# Patient Record
Sex: FEMALE | Race: WHITE | NOT HISPANIC OR LATINO
[De-identification: names, ages, dates, MRNs, and addresses within clinical notes are randomized per-mention and may not be internally consistent; named-entity substitution may affect disease eponyms.]

---

## 2019-11-25 PROBLEM — Z00.00 ENCOUNTER FOR PREVENTIVE HEALTH EXAMINATION: Status: ACTIVE | Noted: 2019-11-25

## 2019-12-19 ENCOUNTER — APPOINTMENT (OUTPATIENT)
Dept: GYNECOLOGIC ONCOLOGY | Facility: CLINIC | Age: 34
End: 2019-12-19
Payer: COMMERCIAL

## 2019-12-19 ENCOUNTER — APPOINTMENT (OUTPATIENT)
Dept: PLASTIC SURGERY | Facility: CLINIC | Age: 34
End: 2019-12-19
Payer: COMMERCIAL

## 2019-12-19 ENCOUNTER — APPOINTMENT (OUTPATIENT)
Dept: SURGERY | Facility: CLINIC | Age: 34
End: 2019-12-19
Payer: COMMERCIAL

## 2019-12-19 VITALS
SYSTOLIC BLOOD PRESSURE: 120 MMHG | DIASTOLIC BLOOD PRESSURE: 80 MMHG | TEMPERATURE: 98.4 F | WEIGHT: 134 LBS | HEIGHT: 64 IN | HEART RATE: 81 BPM | BODY MASS INDEX: 22.88 KG/M2 | OXYGEN SATURATION: 98 %

## 2019-12-19 DIAGNOSIS — Z80.3 FAMILY HISTORY OF MALIGNANT NEOPLASM OF BREAST: ICD-10-CM

## 2019-12-19 PROCEDURE — 99203 OFFICE O/P NEW LOW 30 MIN: CPT

## 2019-12-19 PROCEDURE — 99205 OFFICE O/P NEW HI 60 MIN: CPT

## 2019-12-19 NOTE — HISTORY OF PRESENT ILLNESS
[FreeTextEntry1] : 33 y/o F with BRCA1 positive mutation is referred from Dr. Power to discuss her options for breast reconstruction. She is planning bilateral prophylactic mastectomy. Current bra size is A cup. She has travelled from [place]. History of 3 pregnancies via . Strong family history of breast cancer. She is most interested in autologous tissue reconstruction. Nonsmoker. \par \par exam: A cup, no nipple inversion, no masses, no scars\par abdomen: adequate pannus for autologous tissue transfer, may be smaller than current size\par \par Today we reviewed all options for breast reconstruction. Patient most interested in autologous reconstruction. Nature of the surgery, risks, benefits, alternatives, expected postoperative course, recovery and long term results were reviewed. Nature of microsurgery, and potential for partial or total flap loss were reviewed. Staged nature of surgery, with additional trips to OR for revisions and nipple reconstruction (if non nipple sparing mastectomy) was reviewed. All questions answered. Ms. PREM WALL is motivated to proceed with surgery.\par Will plan to obtain CT angiogram of abdomen and pelvis prior to surgery to help map vascular perforators. Will coordinate with Dr. Power.\par

## 2019-12-20 ENCOUNTER — APPOINTMENT (OUTPATIENT)
Dept: ULTRASOUND IMAGING | Facility: HOSPITAL | Age: 34
End: 2019-12-20

## 2019-12-20 ENCOUNTER — OUTPATIENT (OUTPATIENT)
Dept: OUTPATIENT SERVICES | Facility: HOSPITAL | Age: 34
LOS: 1 days | End: 2019-12-20
Payer: COMMERCIAL

## 2019-12-20 ENCOUNTER — RESULT REVIEW (OUTPATIENT)
Age: 34
End: 2019-12-20

## 2019-12-20 DIAGNOSIS — R22.32 LOCALIZED SWELLING, MASS AND LUMP, LEFT UPPER LIMB: ICD-10-CM

## 2019-12-20 DIAGNOSIS — N64.9 DISORDER OF BREAST, UNSPECIFIED: ICD-10-CM

## 2019-12-20 PROCEDURE — 88341 IMHCHEM/IMCYTCHM EA ADD ANTB: CPT | Mod: 26,59

## 2019-12-20 PROCEDURE — 88341 IMHCHEM/IMCYTCHM EA ADD ANTB: CPT

## 2019-12-20 PROCEDURE — 88360 TUMOR IMMUNOHISTOCHEM/MANUAL: CPT | Mod: 26

## 2019-12-20 PROCEDURE — 88305 TISSUE EXAM BY PATHOLOGIST: CPT

## 2019-12-20 PROCEDURE — 88305 TISSUE EXAM BY PATHOLOGIST: CPT | Mod: 26

## 2019-12-20 PROCEDURE — 19084 BX BREAST ADD LESION US IMAG: CPT

## 2019-12-20 PROCEDURE — 19083 BX BREAST 1ST LESION US IMAG: CPT

## 2019-12-20 PROCEDURE — 38505 NEEDLE BIOPSY LYMPH NODES: CPT

## 2019-12-20 PROCEDURE — 19083 BX BREAST 1ST LESION US IMAG: CPT | Mod: LT

## 2019-12-20 PROCEDURE — 76942 ECHO GUIDE FOR BIOPSY: CPT

## 2019-12-20 PROCEDURE — 19084 BX BREAST ADD LESION US IMAG: CPT | Mod: LT

## 2019-12-20 PROCEDURE — 88360 TUMOR IMMUNOHISTOCHEM/MANUAL: CPT

## 2019-12-20 PROCEDURE — 76641 ULTRASOUND BREAST COMPLETE: CPT

## 2019-12-20 PROCEDURE — 88342 IMHCHEM/IMCYTCHM 1ST ANTB: CPT | Mod: 26,59

## 2019-12-20 PROCEDURE — 76641 ULTRASOUND BREAST COMPLETE: CPT | Mod: 26,LT

## 2019-12-20 PROCEDURE — 77065 DX MAMMO INCL CAD UNI: CPT | Mod: 26,LT

## 2019-12-20 PROCEDURE — 77065 DX MAMMO INCL CAD UNI: CPT

## 2019-12-20 PROCEDURE — A4648: CPT

## 2019-12-22 NOTE — PAST MEDICAL HISTORY
[Menstruating] : The patient is menstruating [Menarche Age ____] : age at menarche was [unfilled] [Approximately ___] : the LMP was approximately [unfilled] [Total Preg ___] : G[unfilled] [Live Births ___] : P[unfilled]  [Age At Live Birth ___] : Age at live birth: [unfilled] [Living ___] : Living: [unfilled] [History of Hormone Replacement Treatment] : has no history of hormone replacement treatment [FreeTextEntry6] : None [FreeTextEntry7] : On Junel, been on for 4 years [FreeTextEntry8] : 1st child x12mo, 2nd child x 15mo , 3rd child x 15mo

## 2019-12-22 NOTE — HISTORY OF PRESENT ILLNESS
[FreeTextEntry1] : 34 year old pre-menopausal female, referred by a family friend, presents for consultation regarding her BRCA 1 positive status found on [facility] genetic testing completed on 4/29/2015. To note, patient has traveled from [place] for her consultation today. \par \par

## 2019-12-24 LAB — SURGICAL PATHOLOGY STUDY: SIGNIFICANT CHANGE UP

## 2019-12-31 NOTE — HISTORY OF PRESENT ILLNESS
[FreeTextEntry1] : Problem\par 1) BRCA1 mutation\par 2) No personal history of cancer, Maternal Breast Ca age 46, Maternal aunt breast Ca age 60, Maternal grandmother breast ca age 60\par \par 33 yo self referred for consultation regarding BRCA1 management at time of visit with Klaus Lang and Police.

## 2019-12-31 NOTE — DISCUSSION/SUMMARY
[FreeTextEntry1] : I had a long discussion with the patient with the aid of diagrams and reviewed the pertinent findings. In patients with known deleterious BRCA1 mutation there are several benefits to prophylactic BSO. One is the decreased risk of developing ovarian cancer, the one other benefit is a decrease in the risk of developing breast cancer. The lifetime risk of developing ovarian cancer in patients with deleterious BRCA1 mutation is 40%. The lifetime risk of developing breast cancer is approximately 80%. It is the recommendation of the SGO and ACOG that patients undergo RRSO at the age of 35 or when childbearing is complete. Current data suggest that the origin of most high-grade serous tumors associated with BRCA1 mutation arise from the fallopian. A study assessing the effect of risk reducing salpingectomy (without removal of the ovaries) is ongoing. Without these results we cannot know if RRS will be as effective as RRSO in decreasing risk of ovarian cancer. However, it can be considered as an option in younger women who do not yet want to enter surgical menopause. The average age of ovarian cancer in these patients is 51, however in general age of onset in mutation carriers is younger than in unaffected patients.\par The side effects of pre-menopausal removal of the ovaries were discussed in detail. Effectively removal of the ovaries places patients in “surgical menopause.” Common symptoms include hot flashes, decrease in cognition, skin changes, weight gain, depression, and bone loss. \par Screening modalities have been proven ineffective in detecting ovarian cancer at earlier stages. In the absence of better options pelvic exams, transvaginal ultrasounds and CA-125 every 6 months can be considered for patients who decline surgical intervention.\par \par

## 2019-12-31 NOTE — PAST MEDICAL HISTORY
[Menstruating] : The patient is menstruating [Menarche Age ____] : age at menarche was [unfilled] [Approximately ___] : the LMP was approximately [unfilled] [Total Preg ___] : G[unfilled] [Full Term ___] : Full Term: [unfilled] [FreeTextEntry5] : Vaginal delivery x3\par

## 2020-01-27 VITALS
TEMPERATURE: 98 F | WEIGHT: 136.47 LBS | OXYGEN SATURATION: 99 % | DIASTOLIC BLOOD PRESSURE: 84 MMHG | SYSTOLIC BLOOD PRESSURE: 130 MMHG | HEIGHT: 64 IN | RESPIRATION RATE: 16 BRPM | HEART RATE: 69 BPM

## 2020-01-28 ENCOUNTER — RESULT REVIEW (OUTPATIENT)
Age: 35
End: 2020-01-28

## 2020-01-28 ENCOUNTER — INPATIENT (INPATIENT)
Facility: HOSPITAL | Age: 35
LOS: 3 days | Discharge: HOME | DRG: 581 | End: 2020-02-01
Attending: PLASTIC SURGERY | Admitting: PLASTIC SURGERY
Payer: COMMERCIAL

## 2020-01-28 ENCOUNTER — APPOINTMENT (OUTPATIENT)
Dept: GYNECOLOGIC ONCOLOGY | Facility: HOSPITAL | Age: 35
End: 2020-01-28

## 2020-01-28 ENCOUNTER — APPOINTMENT (OUTPATIENT)
Dept: SURGERY | Facility: CLINIC | Age: 35
End: 2020-01-28
Payer: COMMERCIAL

## 2020-01-28 DIAGNOSIS — Z15.01 GENETIC SUSCEPTIBILITY TO MALIGNANT NEOPLASM OF BREAST: ICD-10-CM

## 2020-01-28 DIAGNOSIS — Z98.890 OTHER SPECIFIED POSTPROCEDURAL STATES: Chronic | ICD-10-CM

## 2020-01-28 PROBLEM — F32.9 MAJOR DEPRESSIVE DISORDER, SINGLE EPISODE, UNSPECIFIED: Chronic | Status: ACTIVE | Noted: 2020-01-27

## 2020-01-28 PROBLEM — M25.539 PAIN IN UNSPECIFIED WRIST: Chronic | Status: ACTIVE | Noted: 2020-01-27

## 2020-01-28 PROBLEM — M76.60 ACHILLES TENDINITIS, UNSPECIFIED LEG: Chronic | Status: ACTIVE | Noted: 2020-01-27

## 2020-01-28 LAB — GLUCOSE BLDC GLUCOMTR-MCNC: 91 MG/DL — SIGNIFICANT CHANGE UP (ref 70–99)

## 2020-01-28 PROCEDURE — 88305 TISSUE EXAM BY PATHOLOGIST: CPT | Mod: 26

## 2020-01-28 PROCEDURE — 58940 REMOVAL OF OVARY(S): CPT

## 2020-01-28 PROCEDURE — 88331 PATH CONSLTJ SURG 1 BLK 1SPC: CPT | Mod: 26

## 2020-01-28 PROCEDURE — 88307 TISSUE EXAM BY PATHOLOGIST: CPT | Mod: 26

## 2020-01-28 PROCEDURE — S2068: CPT | Mod: LT

## 2020-01-28 PROCEDURE — 19303 MAST SIMPLE COMPLETE: CPT | Mod: 80,50,GC

## 2020-01-28 PROCEDURE — 19303 MAST SIMPLE COMPLETE: CPT | Mod: 50

## 2020-01-28 PROCEDURE — 38530 BIOPSY/REMOVAL LYMPH NODES: CPT

## 2020-01-28 PROCEDURE — 35876 REMOVAL OF CLOT IN GRAFT: CPT

## 2020-01-28 RX ORDER — GABAPENTIN 400 MG/1
600 CAPSULE ORAL ONCE
Refills: 0 | Status: COMPLETED | OUTPATIENT
Start: 2020-01-28 | End: 2020-01-28

## 2020-01-28 RX ORDER — METOCLOPRAMIDE HCL 10 MG
10 TABLET ORAL EVERY 6 HOURS
Refills: 0 | Status: DISCONTINUED | OUTPATIENT
Start: 2020-01-28 | End: 2020-02-01

## 2020-01-28 RX ORDER — CEFAZOLIN SODIUM 1 G
2000 VIAL (EA) INJECTION EVERY 8 HOURS
Refills: 0 | Status: DISCONTINUED | OUTPATIENT
Start: 2020-01-28 | End: 2020-01-28

## 2020-01-28 RX ORDER — SODIUM CHLORIDE 9 MG/ML
1000 INJECTION, SOLUTION INTRAVENOUS
Refills: 0 | Status: DISCONTINUED | OUTPATIENT
Start: 2020-01-28 | End: 2020-01-29

## 2020-01-28 RX ORDER — APREPITANT 80 MG/1
40 CAPSULE ORAL ONCE
Refills: 0 | Status: COMPLETED | OUTPATIENT
Start: 2020-01-28 | End: 2020-01-28

## 2020-01-28 RX ORDER — ONDANSETRON 8 MG/1
4 TABLET, FILM COATED ORAL EVERY 6 HOURS
Refills: 0 | Status: DISCONTINUED | OUTPATIENT
Start: 2020-01-28 | End: 2020-02-01

## 2020-01-28 RX ORDER — ASPIRIN/CALCIUM CARB/MAGNESIUM 324 MG
325 TABLET ORAL DAILY
Refills: 0 | Status: DISCONTINUED | OUTPATIENT
Start: 2020-01-28 | End: 2020-02-01

## 2020-01-28 RX ORDER — OXYCODONE HYDROCHLORIDE 5 MG/1
10 TABLET ORAL EVERY 4 HOURS
Refills: 0 | Status: DISCONTINUED | OUTPATIENT
Start: 2020-01-28 | End: 2020-02-01

## 2020-01-28 RX ORDER — SCOPALAMINE 1 MG/3D
1 PATCH, EXTENDED RELEASE TRANSDERMAL ONCE
Refills: 0 | Status: COMPLETED | OUTPATIENT
Start: 2020-01-28 | End: 2020-01-28

## 2020-01-28 RX ORDER — ACETAMINOPHEN 500 MG
1000 TABLET ORAL ONCE
Refills: 0 | Status: COMPLETED | OUTPATIENT
Start: 2020-01-28 | End: 2020-01-28

## 2020-01-28 RX ORDER — CEFAZOLIN SODIUM 1 G
2000 VIAL (EA) INJECTION EVERY 8 HOURS
Refills: 0 | Status: COMPLETED | OUTPATIENT
Start: 2020-01-28 | End: 2020-01-29

## 2020-01-28 RX ORDER — ACETAMINOPHEN 500 MG
975 TABLET ORAL EVERY 8 HOURS
Refills: 0 | Status: DISCONTINUED | OUTPATIENT
Start: 2020-01-28 | End: 2020-02-01

## 2020-01-28 RX ORDER — OXYCODONE HYDROCHLORIDE 5 MG/1
5 TABLET ORAL EVERY 4 HOURS
Refills: 0 | Status: DISCONTINUED | OUTPATIENT
Start: 2020-01-28 | End: 2020-02-01

## 2020-01-28 RX ORDER — HYDROMORPHONE HYDROCHLORIDE 2 MG/ML
0.5 INJECTION INTRAMUSCULAR; INTRAVENOUS; SUBCUTANEOUS
Refills: 0 | Status: DISCONTINUED | OUTPATIENT
Start: 2020-01-28 | End: 2020-02-01

## 2020-01-28 RX ORDER — DIAZEPAM 5 MG
5 TABLET ORAL EVERY 8 HOURS
Refills: 0 | Status: DISCONTINUED | OUTPATIENT
Start: 2020-01-28 | End: 2020-02-01

## 2020-01-28 RX ORDER — BUPIVACAINE 13.3 MG/ML
20 INJECTION, SUSPENSION, LIPOSOMAL INFILTRATION ONCE
Refills: 0 | Status: DISCONTINUED | OUTPATIENT
Start: 2020-01-28 | End: 2020-02-01

## 2020-01-28 RX ORDER — KETOROLAC TROMETHAMINE 30 MG/ML
30 SYRINGE (ML) INJECTION EVERY 6 HOURS
Refills: 0 | Status: DISCONTINUED | OUTPATIENT
Start: 2020-01-28 | End: 2020-01-31

## 2020-01-28 RX ORDER — SENNA PLUS 8.6 MG/1
2 TABLET ORAL AT BEDTIME
Refills: 0 | Status: DISCONTINUED | OUTPATIENT
Start: 2020-01-28 | End: 2020-02-01

## 2020-01-28 RX ADMIN — SCOPALAMINE 1 PATCH: 1 PATCH, EXTENDED RELEASE TRANSDERMAL at 07:40

## 2020-01-28 RX ADMIN — Medication 975 MILLIGRAM(S): at 22:22

## 2020-01-28 RX ADMIN — Medication 975 MILLIGRAM(S): at 23:21

## 2020-01-28 RX ADMIN — Medication 1000 MILLIGRAM(S): at 07:39

## 2020-01-28 RX ADMIN — APREPITANT 40 MILLIGRAM(S): 80 CAPSULE ORAL at 07:39

## 2020-01-28 RX ADMIN — SCOPALAMINE 1 PATCH: 1 PATCH, EXTENDED RELEASE TRANSDERMAL at 20:36

## 2020-01-28 RX ADMIN — Medication 30 MILLIGRAM(S): at 23:39

## 2020-01-28 RX ADMIN — GABAPENTIN 600 MILLIGRAM(S): 400 CAPSULE ORAL at 07:39

## 2020-01-28 RX ADMIN — Medication 2000 MILLIGRAM(S): at 23:25

## 2020-01-28 NOTE — BRIEF OPERATIVE NOTE - NSICDXBRIEFPREOP_GEN_ALL_CORE_FT
PRE-OP DIAGNOSIS:  BRCA1-associated protein-1 tumor predisposition syndrome 28-Jan-2020 11:00:35  Onelia Lambert

## 2020-01-28 NOTE — BRIEF OPERATIVE NOTE - NSICDXBRIEFPROCEDURE_GEN_ALL_CORE_FT
PROCEDURES:  Open bilateral salpingo-oophorectomy (BSO) 28-Jan-2020 20:39:55  Beverly Gutierrez
PROCEDURES:  Bilateral simple mastectomy 28-Jan-2020 11:00:24  Onelia Lambert

## 2020-01-28 NOTE — PROGRESS NOTE ADULT - SUBJECTIVE AND OBJECTIVE BOX
POST-OPERATIVE NOTE    Procedure: bilateral nipple sparing mastectomy with MELVIN reconstruction and bilateral salpingo-oophorectomy    Diagnosis/Indication: BRCA1 gene mutation positive    Surgeon: Dr. Power    S: Pt has no complaints. She states her pain is controlled with the medication.  Denies CP, SOB, HAMILTON, calf tenderness. Denies nausea, vomiting.    O:  T(C): 36.7 (01-28-20 @ 19:20), Max: 36.7 (01-28-20 @ 19:18)  T(F): 98 (01-28-20 @ 19:20), Max: 98 (01-28-20 @ 19:18)  HR: 73 (01-28-20 @ 21:00) (73 - 100)  BP: 108/68 (01-28-20 @ 21:00) (105/60 - 113/66)  RR: 12 (01-28-20 @ 21:00) (11 - 20)  SpO2: 100% (01-28-20 @ 21:00) (100% - 100%)  Wt(kg): --    Gen: NAD, resting comfortably in bed  Breast: incision areas are clean, dry and intact with good coloring and strong doppler sounds bilaterally, 2 JPs to suction with serosang output  C/V: NSR  Pulm: Nonlabored breathing, no respiratory distress  Abd: soft, NT/ND, incision area is C/D/I with 2 JPs to suction with serosang output  Extrem: WWP, no calf edema or tenderness, SCDs in place    A/P: 34y Female s/p above procedure  Diet: NPO sips/chips  IVF: LR @125cc/hr  Pain/nausea control  Lovenox/SCDs/OOBA/IS  Dispo pending pain control, PO tolerance, clinical improvement

## 2020-01-28 NOTE — PACU DISCHARGE NOTE - COMMENTS
Patient discharged from PACU after meeting criteria. Report given to 5W RN Arlene. VSS. RRWNL on room air. Cardiac monitor showing SR. Left radial padilla removed as per protocol. 2x2 dsg placed. Left and right 18g HL with LR at 125cc/h. B/L breast warm to touch, no discoloration good capillary refill and good doppler signals. Lower abd incision with derma bond no drainage noted. JPx 4 L&R breast, L&R abd sutured shea. Drainage amount entered on flow sheet. Patient t/f on bed unmonitored with private duty RN and  in situ. One bag of belongings sent with patient. Transported by transport team.

## 2020-01-28 NOTE — BRIEF OPERATIVE NOTE - OPERATION/FINDINGS
Patient prepped and draped in usual sterile fasion. Periareolar skin incision was made with inferior vertical extension. Flaps were raised in the plane between subcutaneous tissue and breast tissue from the clavicle superiorly, the sternum medially, and the anterior rectus sheath inferiorly, and past the lateral border of the pectoralis major muscle laterally. Hemostasis was achieved. Retroareolar tissue was sent to pathology for frozen section, which showed no malignant cells. At this point, the plastic surgery team continued with reconstruction. A separate operative note will be dictated for that portion of the surgery, please see Dr. Lang's note.
Normal appearing uterus, bilateral fallopian tubes, and bilateral ovaries. Bilateral ureters identified.

## 2020-01-29 LAB
ANION GAP SERPL CALC-SCNC: 10 MMOL/L — SIGNIFICANT CHANGE UP (ref 5–17)
BLD GP AB SCN SERPL QL: NEGATIVE — SIGNIFICANT CHANGE UP
BUN SERPL-MCNC: 10 MG/DL — SIGNIFICANT CHANGE UP (ref 7–23)
CALCIUM SERPL-MCNC: 7.4 MG/DL — LOW (ref 8.4–10.5)
CHLORIDE SERPL-SCNC: 108 MMOL/L — SIGNIFICANT CHANGE UP (ref 96–108)
CO2 SERPL-SCNC: 23 MMOL/L — SIGNIFICANT CHANGE UP (ref 22–31)
CREAT SERPL-MCNC: 0.79 MG/DL — SIGNIFICANT CHANGE UP (ref 0.5–1.3)
GLUCOSE SERPL-MCNC: 120 MG/DL — HIGH (ref 70–99)
HCT VFR BLD CALC: 19.6 % — CRITICAL LOW (ref 34.5–45)
HCT VFR BLD CALC: 21.2 % — LOW (ref 34.5–45)
HGB BLD-MCNC: 6.2 G/DL — CRITICAL LOW (ref 11.5–15.5)
HGB BLD-MCNC: 6.8 G/DL — CRITICAL LOW (ref 11.5–15.5)
MCHC RBC-ENTMCNC: 29.5 PG — SIGNIFICANT CHANGE UP (ref 27–34)
MCHC RBC-ENTMCNC: 29.8 PG — SIGNIFICANT CHANGE UP (ref 27–34)
MCHC RBC-ENTMCNC: 31.6 GM/DL — LOW (ref 32–36)
MCHC RBC-ENTMCNC: 32.1 GM/DL — SIGNIFICANT CHANGE UP (ref 32–36)
MCV RBC AUTO: 93 FL — SIGNIFICANT CHANGE UP (ref 80–100)
MCV RBC AUTO: 93.3 FL — SIGNIFICANT CHANGE UP (ref 80–100)
NRBC # BLD: 0 /100 WBCS — SIGNIFICANT CHANGE UP (ref 0–0)
NRBC # BLD: 0 /100 WBCS — SIGNIFICANT CHANGE UP (ref 0–0)
PLATELET # BLD AUTO: 109 K/UL — LOW (ref 150–400)
PLATELET # BLD AUTO: 121 K/UL — LOW (ref 150–400)
POTASSIUM SERPL-MCNC: 3.9 MMOL/L — SIGNIFICANT CHANGE UP (ref 3.5–5.3)
POTASSIUM SERPL-SCNC: 3.9 MMOL/L — SIGNIFICANT CHANGE UP (ref 3.5–5.3)
RBC # BLD: 2.1 M/UL — LOW (ref 3.8–5.2)
RBC # BLD: 2.28 M/UL — LOW (ref 3.8–5.2)
RBC # FLD: 12.7 % — SIGNIFICANT CHANGE UP (ref 10.3–14.5)
RBC # FLD: 12.7 % — SIGNIFICANT CHANGE UP (ref 10.3–14.5)
RH IG SCN BLD-IMP: POSITIVE — SIGNIFICANT CHANGE UP
SODIUM SERPL-SCNC: 141 MMOL/L — SIGNIFICANT CHANGE UP (ref 135–145)
WBC # BLD: 5.89 K/UL — SIGNIFICANT CHANGE UP (ref 3.8–10.5)
WBC # BLD: 6.83 K/UL — SIGNIFICANT CHANGE UP (ref 3.8–10.5)
WBC # FLD AUTO: 5.89 K/UL — SIGNIFICANT CHANGE UP (ref 3.8–10.5)
WBC # FLD AUTO: 6.83 K/UL — SIGNIFICANT CHANGE UP (ref 3.8–10.5)

## 2020-01-29 RX ORDER — HYDROMORPHONE HYDROCHLORIDE 2 MG/ML
0.5 INJECTION INTRAMUSCULAR; INTRAVENOUS; SUBCUTANEOUS ONCE
Refills: 0 | Status: DISCONTINUED | OUTPATIENT
Start: 2020-01-29 | End: 2020-01-29

## 2020-01-29 RX ORDER — SODIUM CHLORIDE 9 MG/ML
1000 INJECTION, SOLUTION INTRAVENOUS
Refills: 0 | Status: DISCONTINUED | OUTPATIENT
Start: 2020-01-29 | End: 2020-01-30

## 2020-01-29 RX ORDER — ENOXAPARIN SODIUM 100 MG/ML
40 INJECTION SUBCUTANEOUS EVERY 24 HOURS
Refills: 0 | Status: DISCONTINUED | OUTPATIENT
Start: 2020-01-29 | End: 2020-02-01

## 2020-01-29 RX ADMIN — SODIUM CHLORIDE 100 MILLILITER(S): 9 INJECTION, SOLUTION INTRAVENOUS at 09:58

## 2020-01-29 RX ADMIN — ENOXAPARIN SODIUM 40 MILLIGRAM(S): 100 INJECTION SUBCUTANEOUS at 14:01

## 2020-01-29 RX ADMIN — Medication 5 MILLIGRAM(S): at 22:13

## 2020-01-29 RX ADMIN — Medication 975 MILLIGRAM(S): at 15:02

## 2020-01-29 RX ADMIN — Medication 30 MILLIGRAM(S): at 05:51

## 2020-01-29 RX ADMIN — HYDROMORPHONE HYDROCHLORIDE 0.5 MILLIGRAM(S): 2 INJECTION INTRAMUSCULAR; INTRAVENOUS; SUBCUTANEOUS at 14:40

## 2020-01-29 RX ADMIN — Medication 30 MILLIGRAM(S): at 18:35

## 2020-01-29 RX ADMIN — OXYCODONE HYDROCHLORIDE 5 MILLIGRAM(S): 5 TABLET ORAL at 11:02

## 2020-01-29 RX ADMIN — OXYCODONE HYDROCHLORIDE 5 MILLIGRAM(S): 5 TABLET ORAL at 12:02

## 2020-01-29 RX ADMIN — Medication 975 MILLIGRAM(S): at 23:14

## 2020-01-29 RX ADMIN — SENNA PLUS 2 TABLET(S): 8.6 TABLET ORAL at 22:19

## 2020-01-29 RX ADMIN — Medication 5 MILLIGRAM(S): at 04:00

## 2020-01-29 RX ADMIN — Medication 2000 MILLIGRAM(S): at 14:01

## 2020-01-29 RX ADMIN — Medication 325 MILLIGRAM(S): at 12:16

## 2020-01-29 RX ADMIN — OXYCODONE HYDROCHLORIDE 10 MILLIGRAM(S): 5 TABLET ORAL at 18:46

## 2020-01-29 RX ADMIN — Medication 5 MILLIGRAM(S): at 12:38

## 2020-01-29 RX ADMIN — Medication 975 MILLIGRAM(S): at 05:51

## 2020-01-29 RX ADMIN — Medication 975 MILLIGRAM(S): at 22:14

## 2020-01-29 RX ADMIN — SCOPALAMINE 1 PATCH: 1 PATCH, EXTENDED RELEASE TRANSDERMAL at 18:12

## 2020-01-29 RX ADMIN — OXYCODONE HYDROCHLORIDE 10 MILLIGRAM(S): 5 TABLET ORAL at 19:35

## 2020-01-29 RX ADMIN — Medication 975 MILLIGRAM(S): at 14:01

## 2020-01-29 RX ADMIN — Medication 30 MILLIGRAM(S): at 12:40

## 2020-01-29 RX ADMIN — Medication 30 MILLIGRAM(S): at 18:11

## 2020-01-29 RX ADMIN — HYDROMORPHONE HYDROCHLORIDE 0.5 MILLIGRAM(S): 2 INJECTION INTRAMUSCULAR; INTRAVENOUS; SUBCUTANEOUS at 15:00

## 2020-01-29 RX ADMIN — Medication 30 MILLIGRAM(S): at 12:16

## 2020-01-29 RX ADMIN — Medication 2000 MILLIGRAM(S): at 05:51

## 2020-01-29 RX ADMIN — SCOPALAMINE 1 PATCH: 1 PATCH, EXTENDED RELEASE TRANSDERMAL at 06:03

## 2020-01-29 NOTE — PROVIDER CONTACT NOTE (CRITICAL VALUE NOTIFICATION) - ACTION/TREATMENT ORDERED:
ASHLEY Rivas from Team 1 made aware, action from team pending.
Plastics team will be rounding on pt shortly.

## 2020-01-29 NOTE — PROGRESS NOTE ADULT - SUBJECTIVE AND OBJECTIVE BOX
Patient seen at bedside. Pain is well controlled. Patient has a borges and SCDs. Has not yet passed flatus or ambulated. Tolerating clears. Denies headache, dizziness, nausea/vomiting, shortness of breath, palpitations, heavy bleeding.     T(C): 37.3 (01-29-20 @ 08:46), Max: 37.9 (01-28-20 @ 23:00)  HR: 86 (01-29-20 @ 08:46) (73 - 100)  BP: 84/57 (01-29-20 @ 08:46) (84/57 - 113/66)  RR: 17 (01-29-20 @ 08:46) (11 - 20)  SpO2: 97% (01-29-20 @ 08:46) (97% - 100%)    Gen: NAD, AO x3  Chest: normal work of breathing  Abdomen: soft, nontender, nondistended, no rebound or guarding, +bowel sounds  Incision: well approximated, no erythema or drainage  Extremities: no calf tenderness or erythema      01-28-20 @ 07:01  -  01-29-20 @ 07:00  --------------------------------------------------------  IN: 1125 mL / OUT: 1390 mL / NET: -265 mL    01-29-20 @ 07:01  -  01-29-20 @ 10:01  --------------------------------------------------------  IN: 350 mL / OUT: 0 mL / NET: 350 mL                              6.8    6.83  )-----------( 121      ( 29 Jan 2020 06:10 )             21.2     MEDICATIONS  (STANDING):  acetaminophen   Tablet .. 975 milliGRAM(s) Oral every 8 hours  aspirin 325 milliGRAM(s) Oral daily  BUpivacaine liposome 1.3% Injectable (no eMAR) 20 milliLiter(s) Local Injection once  ceFAZolin  Injectable. 2000 milliGRAM(s) IV Push every 8 hours  dextrose 5% + sodium chloride 0.45%. 1000 milliLiter(s) (100 mL/Hr) IV Continuous <Continuous>  enoxaparin Injectable 40 milliGRAM(s) SubCutaneous daily  ketorolac   Injectable 30 milliGRAM(s) IV Push every 6 hours    MEDICATIONS  (PRN):  diazepam    Tablet 5 milliGRAM(s) Oral every 8 hours PRN muscle spasms  HYDROmorphone  Injectable 0.5 milliGRAM(s) IV Push every 15 minutes PRN Severe Pain (7 - 10)  metoclopramide Injectable 10 milliGRAM(s) IV Push every 6 hours PRN Nausea and/or Vomiting  ondansetron Injectable 4 milliGRAM(s) IV Push every 6 hours PRN Nausea  oxyCODONE    IR 5 milliGRAM(s) Oral every 4 hours PRN Moderate Pain (4 - 6)  oxyCODONE    IR 10 milliGRAM(s) Oral every 4 hours PRN Severe Pain (7 - 10)  senna 2 Tablet(s) Oral at bedtime PRN Constipation

## 2020-01-29 NOTE — PROGRESS NOTE ADULT - ASSESSMENT
A/P 34F s/p b/l simple NSM, b/l BSO, breast reconstruction with bilateral MELVIN flaps POD1  - Continue q1 hour flap check  - D/C borges  - IV tylenol/toradol  - Continue ASA  - Lovenox  - Ambulate with assistance  - CLD  - Change IVF to maintenance  - PT eval  - Will monitor Hgb and may transfuse if patient is symptomatic; currently she is not

## 2020-01-29 NOTE — PROGRESS NOTE ADULT - ASSESSMENT
33 yo female with PMH BRCA 1 pathologic mutation with multiple family members with breast cancer presents for prophylactic bilateral nipple sparing mastectomy with MELVIN reconstruction and bilateral salpingo-oophorectomy     Pain/Nausea control   NPO except meds/IVF   Aspirin   Repeat CBC @ 12 pm 1/29  4 DEVEN drains

## 2020-01-29 NOTE — PROGRESS NOTE ADULT - SUBJECTIVE AND OBJECTIVE BOX
Plastic Surgery    SUBJECTIVE:   Patient did well overnight, pain controlled with tylenol, toradol, valium      VITALS  T(C): 36.9 (01-29-20 @ 05:41), Max: 37.9 (01-28-20 @ 23:00)  HR: 90 (01-29-20 @ 05:41) (73 - 100)  BP: 97/59 (01-29-20 @ 05:41) (97/59 - 113/66)  RR: 16 (01-29-20 @ 05:41) (11 - 20)  SpO2: 97% (01-29-20 @ 05:41) (97% - 100%)  CAPILLARY BLOOD GLUCOSE      POCT Blood Glucose.: 91 mg/dL (28 Jan 2020 07:46)      Is/Os    01-28 @ 07:01  -  01-29 @ 07:00  --------------------------------------------------------  IN:    lactated ringers.: 1125 mL  Total IN: 1125 mL    OUT:    Drain: 75 mL    Drain: 25 mL    Drain: 50 mL    Drain: 55 mL    Indwelling Catheter - Urethral: 1185 mL  Total OUT: 1390 mL    Total NET: -265 mL          PHYSICAL EXAM:   General: NAD, Lying in bed comfortably  Chest: Bilateral breast incisions intact, appropriately TTP in axillae. Stable amy ecchymoses over bilateral upper poles.  NAC appear viable and well positioned bilaterally.  MELVIN flaps appropriate color, temperature, turgor, cap refill, and will good arterial doppler signals  Abd: Dressing over Umbo CDI, abdominal incision intact with minimal bruising.  All drains SS    MEDICATIONS (STANDING): acetaminophen   Tablet .. 975 milliGRAM(s) Oral every 8 hours  aspirin 325 milliGRAM(s) Oral daily  BUpivacaine liposome 1.3% Injectable (no eMAR) 20 milliLiter(s) Local Injection once  ceFAZolin  Injectable. 2000 milliGRAM(s) IV Push every 8 hours  ketorolac   Injectable 30 milliGRAM(s) IV Push every 6 hours  lactated ringers. 1000 milliLiter(s) IV Continuous <Continuous>    MEDICATIONS (PRN):diazepam    Tablet 5 milliGRAM(s) Oral every 8 hours PRN muscle spasms  HYDROmorphone  Injectable 0.5 milliGRAM(s) IV Push every 15 minutes PRN Severe Pain (7 - 10)  metoclopramide Injectable 10 milliGRAM(s) IV Push every 6 hours PRN Nausea and/or Vomiting  ondansetron Injectable 4 milliGRAM(s) IV Push every 6 hours PRN Nausea  oxyCODONE    IR 5 milliGRAM(s) Oral every 4 hours PRN Moderate Pain (4 - 6)  oxyCODONE    IR 10 milliGRAM(s) Oral every 4 hours PRN Severe Pain (7 - 10)  senna 2 Tablet(s) Oral at bedtime PRN Constipation      LABS  CBC (01-29 @ 06:10)                              6.8<LL>                         6.83    )----------------(  121<L>     --    % Neutrophils, --    % Lymphocytes, ANC: --                                  21.2<L>    BMP (01-29 @ 06:10)             141     |  108     |  10    		Ca++ --      Ca 7.4<L>             ---------------------------------( 120<H>		Mg --                 3.9     |  23      |  0.79  			Ph --                    IMAGING STUDIES

## 2020-01-29 NOTE — PROGRESS NOTE ADULT - SUBJECTIVE AND OBJECTIVE BOX
SUBJECTIVE: Patient seen and examined at bedside with chief. Pain controlled with medication, has not ambulated out of bed.   Patient denies chest pain, shortness of breathe, nausea or emesis.       MEDICATIONS  (STANDING):  acetaminophen   Tablet .. 975 milliGRAM(s) Oral every 8 hours  aspirin 325 milliGRAM(s) Oral daily  BUpivacaine liposome 1.3% Injectable (no eMAR) 20 milliLiter(s) Local Injection once  ceFAZolin  Injectable. 2000 milliGRAM(s) IV Push every 8 hours  ketorolac   Injectable 30 milliGRAM(s) IV Push every 6 hours  lactated ringers. 1000 milliLiter(s) (125 mL/Hr) IV Continuous <Continuous>    MEDICATIONS  (PRN):  diazepam    Tablet 5 milliGRAM(s) Oral every 8 hours PRN muscle spasms  HYDROmorphone  Injectable 0.5 milliGRAM(s) IV Push every 15 minutes PRN Severe Pain (7 - 10)  metoclopramide Injectable 10 milliGRAM(s) IV Push every 6 hours PRN Nausea and/or Vomiting  ondansetron Injectable 4 milliGRAM(s) IV Push every 6 hours PRN Nausea  oxyCODONE    IR 5 milliGRAM(s) Oral every 4 hours PRN Moderate Pain (4 - 6)  oxyCODONE    IR 10 milliGRAM(s) Oral every 4 hours PRN Severe Pain (7 - 10)  senna 2 Tablet(s) Oral at bedtime PRN Constipation      Vital Signs Last 24 Hrs  T(C): 36.9 (29 Jan 2020 05:41), Max: 37.9 (28 Jan 2020 23:00)  T(F): 98.5 (29 Jan 2020 05:41), Max: 100.2 (28 Jan 2020 23:00)  HR: 90 (29 Jan 2020 05:41) (73 - 100)  BP: 97/59 (29 Jan 2020 05:41) (97/59 - 113/66)  BP(mean): 80 (29 Jan 2020 00:30) (75 - 87)  RR: 16 (29 Jan 2020 05:41) (11 - 20)  SpO2: 97% (29 Jan 2020 05:41) (97% - 100%)    Physical Exam:  GENERAL: NAD, Resting comfortably in bed  RESP: Nonlabored breathing, No respiratory distress  Breast: 4 DEVEN drains, with SS output, incisions are clean dry and intact, no bruising noted around incisions sites or around breasts   CARD: Normal rate, Normal peripheral perfusion  GI: Soft, NT, ND, No guarding, No rebound tenderness  EXTREM: WWP, No edema, SCDs in place    I&O's Summary    28 Jan 2020 07:01  -  29 Jan 2020 07:00  --------------------------------------------------------  IN: 1125 mL / OUT: 1390 mL / NET: -265 mL        LABS:                        6.8    6.83  )-----------( 121      ( 29 Jan 2020 06:10 )             21.2     01-29    141  |  108  |  10  ----------------------------<  120<H>  3.9   |  23  |  0.79    Ca    7.4<L>      29 Jan 2020 06:10          CAPILLARY BLOOD GLUCOSE      POCT Blood Glucose.: 91 mg/dL (28 Jan 2020 07:46)

## 2020-01-29 NOTE — PROGRESS NOTE ADULT - ASSESSMENT
34y Female BRCA 1 pathologic mutation with multiple family members with breast cancer POD# 1   s/p  presents for prophylactic bilateral nipple sparing mastectomy with MELVIN reconstruction and risk reducing bilateral salpingo-oophorectomy, doing well. Primary management by surgery team, GYN to sign off at this time. Patient has follow up appointment scheduled with Dr. Giron on February 6th at 12:15 PM. Please call GYN with any questions or concerns.

## 2020-01-30 ENCOUNTER — TRANSCRIPTION ENCOUNTER (OUTPATIENT)
Age: 35
End: 2020-01-30

## 2020-01-30 RX ORDER — OXYCODONE HYDROCHLORIDE 5 MG/1
1 TABLET ORAL
Qty: 30 | Refills: 0
Start: 2020-01-30 | End: 2020-02-05

## 2020-01-30 RX ORDER — DIAZEPAM 5 MG
1 TABLET ORAL
Qty: 21 | Refills: 0
Start: 2020-01-30 | End: 2020-02-05

## 2020-01-30 RX ORDER — SODIUM CHLORIDE 9 MG/ML
1000 INJECTION, SOLUTION INTRAVENOUS
Refills: 0 | Status: DISCONTINUED | OUTPATIENT
Start: 2020-01-30 | End: 2020-01-30

## 2020-01-30 RX ORDER — ASPIRIN/CALCIUM CARB/MAGNESIUM 324 MG
1 TABLET ORAL
Qty: 0 | Refills: 0 | DISCHARGE
Start: 2020-01-30

## 2020-01-30 RX ADMIN — ENOXAPARIN SODIUM 40 MILLIGRAM(S): 100 INJECTION SUBCUTANEOUS at 12:31

## 2020-01-30 RX ADMIN — Medication 975 MILLIGRAM(S): at 06:22

## 2020-01-30 RX ADMIN — Medication 975 MILLIGRAM(S): at 21:56

## 2020-01-30 RX ADMIN — Medication 5 MILLIGRAM(S): at 23:07

## 2020-01-30 RX ADMIN — OXYCODONE HYDROCHLORIDE 10 MILLIGRAM(S): 5 TABLET ORAL at 19:45

## 2020-01-30 RX ADMIN — Medication 30 MILLIGRAM(S): at 12:30

## 2020-01-30 RX ADMIN — Medication 975 MILLIGRAM(S): at 07:22

## 2020-01-30 RX ADMIN — Medication 30 MILLIGRAM(S): at 00:30

## 2020-01-30 RX ADMIN — Medication 30 MILLIGRAM(S): at 23:31

## 2020-01-30 RX ADMIN — Medication 325 MILLIGRAM(S): at 12:30

## 2020-01-30 RX ADMIN — Medication 30 MILLIGRAM(S): at 06:37

## 2020-01-30 RX ADMIN — SCOPALAMINE 1 PATCH: 1 PATCH, EXTENDED RELEASE TRANSDERMAL at 18:03

## 2020-01-30 RX ADMIN — OXYCODONE HYDROCHLORIDE 10 MILLIGRAM(S): 5 TABLET ORAL at 04:15

## 2020-01-30 RX ADMIN — Medication 30 MILLIGRAM(S): at 18:00

## 2020-01-30 RX ADMIN — SCOPALAMINE 1 PATCH: 1 PATCH, EXTENDED RELEASE TRANSDERMAL at 06:31

## 2020-01-30 RX ADMIN — OXYCODONE HYDROCHLORIDE 10 MILLIGRAM(S): 5 TABLET ORAL at 18:59

## 2020-01-30 RX ADMIN — Medication 30 MILLIGRAM(S): at 23:06

## 2020-01-30 RX ADMIN — OXYCODONE HYDROCHLORIDE 10 MILLIGRAM(S): 5 TABLET ORAL at 03:15

## 2020-01-30 RX ADMIN — Medication 30 MILLIGRAM(S): at 12:50

## 2020-01-30 RX ADMIN — Medication 30 MILLIGRAM(S): at 06:22

## 2020-01-30 RX ADMIN — OXYCODONE HYDROCHLORIDE 10 MILLIGRAM(S): 5 TABLET ORAL at 10:56

## 2020-01-30 RX ADMIN — OXYCODONE HYDROCHLORIDE 10 MILLIGRAM(S): 5 TABLET ORAL at 11:40

## 2020-01-30 RX ADMIN — SENNA PLUS 2 TABLET(S): 8.6 TABLET ORAL at 21:58

## 2020-01-30 RX ADMIN — Medication 30 MILLIGRAM(S): at 00:45

## 2020-01-30 RX ADMIN — Medication 975 MILLIGRAM(S): at 13:51

## 2020-01-30 RX ADMIN — Medication 975 MILLIGRAM(S): at 22:56

## 2020-01-30 RX ADMIN — Medication 5 MILLIGRAM(S): at 06:22

## 2020-01-30 RX ADMIN — Medication 975 MILLIGRAM(S): at 14:40

## 2020-01-30 NOTE — PROGRESS NOTE ADULT - ASSESSMENT
33 yo female with PMH BRCA 1 pathologic mutation with multiple family members with breast cancer presents for prophylactic bilateral nipple sparing mastectomy with MELVIN reconstruction and bilateral salpingo-oophorectomy. Pulse checks appropriate, doing well.    Hgb stable x2 at 6.8 to 6.2 yesterday, patient asymptomatic, normotensive, and no tachycardia. Plastics aware and managing.     Pain controlled.   DVT ppx   Encourage IS/Ambulation/OOB

## 2020-01-30 NOTE — PHYSICAL THERAPY INITIAL EVALUATION ADULT - PERTINENT HX OF CURRENT PROBLEM, REHAB EVAL
35 yo female with PMH BRCA 1 pathologic mutation with multiple family members with breast cancer presents for prophylactic bilateral nipple sparing mastectomy with MELVIN reconstruction and bilateral salpingo-oophorectomy

## 2020-01-30 NOTE — DISCHARGE NOTE PROVIDER - NSDCFUSCHEDAPPT_GEN_ALL_CORE_FT
KACIE WALL ; 02/06/2020 ; [facility] [address]  KACIE WALL ; 02/24/2020 ; [facility] [address]

## 2020-01-30 NOTE — DISCHARGE NOTE PROVIDER - NSDCFUADDINST_GEN_ALL_CORE_FT
*Please refer to the post-operative care instructions provided from Dr. Lang’s office.     -Follow up with Plastic & Reconstructive Surgeon, Dr. Lang in 1 week in the office.   -Follow up with Breast Surgeon, Dr. Power in 1-2 weeks in the office.    -Continue DEVEN drain care as instructed. (Empty and record the DEVEN drainage twice daily after discharge. Also, strip/milk the drain tubing each time to minimize clogging. Bring the recorded drain amounts to the office so that it can be reviewed by the physician.)     -Take Percocet as prescribed for pain control.     -Diet: no restrictions.     -Showers are permitted the day of discharge. The drains and the incision lines can get wet in the shower. Do not take a bath. Pin the drains to a bathrobe belt or string or a small towel draped over your neck in order that the drains do not dangle from your skin while in the shower. Keep incision sites and DEVEN drain sites clean & dry after showering.     -No heavy lifting >20 pounds or strenuous exercises.       -Call Doctor’s office or return to ER if: fever (temperature >101.4F), chills, chest pain, shortness of breath, uncontrolled/severe pain, persistent nausea/vomiting, or bleeding/oozing/redness/swelling at incision sites.    -For routine questions, call the office ([phone]) weekdays 9:00 A.M. - 5:00 P.M.  For emergencies after business hours, call any time using this same office phone number and the answering service will put you in touch with Dr. Lang.

## 2020-01-30 NOTE — PHYSICAL THERAPY INITIAL EVALUATION ADULT - ACTIVE RANGE OF MOTION EXAMINATION, REHAB EVAL
BUE shoulder flex < 90 deg 2/2 surgical precautions/bilateral  lower extremity Active ROM was WFL (within functional limits)

## 2020-01-30 NOTE — PROGRESS NOTE ADULT - SUBJECTIVE AND OBJECTIVE BOX
DAILY PROGRESS NOTE:    S: No acute events overnight. She was able to walk around the unit last night and tolerate diet. Admits to slight tightness at abdominal incision, which is improving. Able to void. Sitting in bedside chair.    O:    Vital Signs Last 24 Hrs  T(C): 36.8 (30 Jan 2020 00:40), Max: 37.3 (29 Jan 2020 08:46)  T(F): 98.2 (30 Jan 2020 00:40), Max: 99.1 (29 Jan 2020 08:46)  HR: 81 (30 Jan 2020 00:40) (68 - 96)  BP: 93/60 (30 Jan 2020 00:40) (84/57 - 93/60)  BP(mean): --  RR: 18 (30 Jan 2020 00:40) (17 - 18)  SpO2: 97% (30 Jan 2020 00:40) (97% - 99%)    I&O's Detail    28 Jan 2020 07:01  -  29 Jan 2020 07:00  --------------------------------------------------------  IN:    lactated ringers.: 1125 mL  Total IN: 1125 mL    OUT:    Drain: 75 mL    Drain: 25 mL    Drain: 50 mL    Drain: 55 mL    Indwelling Catheter - Urethral: 1185 mL  Total OUT: 1390 mL    Total NET: -265 mL      29 Jan 2020 07:01  -  30 Jan 2020 06:05  --------------------------------------------------------  IN:    dextrose 5% + sodium chloride 0.45%.: 1700 mL    lactated ringers.: 250 mL  Total IN: 1950 mL    OUT:    Drain: 57 mL    Drain: 47 mL    Drain: 26 mL    Drain: 60 mL    Indwelling Catheter - Urethral: 2000 mL    Voided: 1400 mL  Total OUT: 3590 mL    Total NET: -1640 mL          Physical Exam:      Gen: NAD  Neuro: A&Ox3  Resp: No incr WOB  CV: not tachycardic  Breast: surgical bra in palce. left and right side breast DEVEN drains in place with serosanguinous output. soft breast, no hematomas. incisions sites c/d/i. slight bruising. skin paddles soft, pink. adequate doppler signs bilaterally. nipple skin viable.  Abd: Soft, nondistended. 2 abdominal DEVEN drains in place with serosanguinous output. Abdominal incision c/d/i, no bleeding or erythema.   : Voids  Extr: WWP, no edema        LABS:                        6.2    5.89  )-----------( 109      ( 29 Jan 2020 12:34 )             19.6     01-29    141  |  108  |  10  ----------------------------<  120<H>  3.9   |  23  |  0.79    Ca    7.4<L>      29 Jan 2020 06:10            RADIOLOGY & ADDITIONAL STUDIES:

## 2020-01-30 NOTE — DISCHARGE NOTE PROVIDER - CARE PROVIDER_API CALL
Carlton Lang)  Plastic Surgery  [address] [address]  Phone: [phone]  Fax: [phone]  Follow Up Time:     Bianca Power)  Surgery  [address] [facility], [address]  [place] [address]  Phone: [phone]  Fax: [phone]  Follow Up Time:     Serina Giron (DO)  Gynecologic Oncology; Obstetrics and Gynecology  [address], [address]  [place] [address]  Phone: [phone]  Fax: [phone]  Follow Up Time:

## 2020-01-30 NOTE — DISCHARGE NOTE PROVIDER - NSDCCPCAREPLAN_GEN_ALL_CORE_FT
PRINCIPAL DISCHARGE DIAGNOSIS  Diagnosis: BRCA1 gene mutation positive  Assessment and Plan of Treatment:

## 2020-01-30 NOTE — PROGRESS NOTE ADULT - SUBJECTIVE AND OBJECTIVE BOX
Ortho Note    Pt comfortable without complaints, pain controlled  Denies dizziness while in bed or standing, no chest pain or SOB    Vital Signs Last 24 Hrs  T(C): 37 (01-30-20 @ 06:00), Max: 37 (01-30-20 @ 06:00)  T(F): 98.6 (01-30-20 @ 06:00), Max: 98.6 (01-30-20 @ 06:00)  HR: 72 (01-30-20 @ 06:00) (72 - 72)  BP: 99/64 (01-30-20 @ 06:00) (99/64 - 99/64)  BP(mean): --  RR: 16 (01-30-20 @ 06:00) (16 - 16)  SpO2: 99% (01-30-20 @ 06:00) (99% - 99%)      General: Pt Alert and oriented, NAD  Chest: Bilateral breast incisions intact, mild TTP in axillae   Stable amy ecchymoses over bilateral upper poles.  NAC appear viable and well positioned bilaterally.   MELVIN flaps appropriate color, temperature, turgor, cap refill, and will good arterial doppler signals  Abd: Dressing C/D/I, abdominal incision intact with minimal bruising.    All drains w Serosanguinous output                        6.2    5.89  )-----------( 109      ( 29 Jan 2020 12:34 )             19.6   29 Jan 2020 06:10    141    |  108    |  10     ----------------------------<  120    3.9     |  23     |  0.79           A/P 34F s/p b/l simple NSM, b/l BSO, breast reconstruction with bilateral MELVIN flaps POD2  - Continue q1 hour flap check  - IV tylenol/toradol  - Continue ASA  - Lovenox  - Ambulate with assistance  - CLD  - continue maintenance fluids.   - PT eval/WBAT

## 2020-01-30 NOTE — PHYSICAL THERAPY INITIAL EVALUATION ADULT - ADDITIONAL COMMENTS
independent prior to arrival, staying in apartment in [facility], 4 steps to enter, will have private RN upon discharge to assist patient

## 2020-01-30 NOTE — DISCHARGE NOTE PROVIDER - PROVIDER TOKENS
PROVIDER:[TOKEN:[[id number]]],PROVIDER:[TOKEN:[[id number]]],PROVIDER:[TOKEN:[[id number]]]

## 2020-01-30 NOTE — DISCHARGE NOTE PROVIDER - CARE PROVIDERS DIRECT ADDRESSES
[email or web address]

## 2020-01-30 NOTE — DISCHARGE NOTE PROVIDER - NSDCMRMEDTOKEN_GEN_ALL_CORE_FT
acetaminophen-oxyCODONE 325 mg-5 mg oral tablet: 1-2  tab(s) orally every 4 hours, As Needed MDD:12  aspirin 325 mg oral tablet: 1 tab(s) orally once a day  Junel 1/20 oral tablet: 1 tab(s) orally once a day  Valium 5 mg oral tablet: 1 tab(s) orally every 8 hours, As needed, muscle spasms MDD:3

## 2020-01-30 NOTE — DISCHARGE NOTE PROVIDER - HOSPITAL COURSE
35 yo F underwent elective BSO, bilateral mastectomy, and immediate breast reconstruction with MELVIN flaps. Patient tolerated the procedure well and had uneventful postoperative hospital course.  On the day of the discharge, patient was evaluated and deemed in stable condition to be discharged to home. Follow up in one week with Dr. Lang, Dr. Power, and Dr. Giron in the office.

## 2020-01-30 NOTE — PHYSICAL THERAPY INITIAL EVALUATION ADULT - DID THE PATIENT HAVE SURGERY?
bilateral nipple sparing mastectomy with MELVIN reconstruction and bilateral salpingo-oophorectomy/yes

## 2020-01-31 RX ADMIN — Medication 5 MILLIGRAM(S): at 11:10

## 2020-01-31 RX ADMIN — Medication 30 MILLIGRAM(S): at 06:15

## 2020-01-31 RX ADMIN — SCOPALAMINE 1 PATCH: 1 PATCH, EXTENDED RELEASE TRANSDERMAL at 07:13

## 2020-01-31 RX ADMIN — OXYCODONE HYDROCHLORIDE 10 MILLIGRAM(S): 5 TABLET ORAL at 14:36

## 2020-01-31 RX ADMIN — Medication 30 MILLIGRAM(S): at 11:10

## 2020-01-31 RX ADMIN — Medication 975 MILLIGRAM(S): at 14:16

## 2020-01-31 RX ADMIN — Medication 975 MILLIGRAM(S): at 23:26

## 2020-01-31 RX ADMIN — Medication 5 MILLIGRAM(S): at 20:20

## 2020-01-31 RX ADMIN — Medication 30 MILLIGRAM(S): at 11:00

## 2020-01-31 RX ADMIN — Medication 975 MILLIGRAM(S): at 07:55

## 2020-01-31 RX ADMIN — OXYCODONE HYDROCHLORIDE 10 MILLIGRAM(S): 5 TABLET ORAL at 02:53

## 2020-01-31 RX ADMIN — OXYCODONE HYDROCHLORIDE 10 MILLIGRAM(S): 5 TABLET ORAL at 15:44

## 2020-01-31 RX ADMIN — Medication 30 MILLIGRAM(S): at 17:35

## 2020-01-31 RX ADMIN — Medication 325 MILLIGRAM(S): at 11:10

## 2020-01-31 RX ADMIN — ENOXAPARIN SODIUM 40 MILLIGRAM(S): 100 INJECTION SUBCUTANEOUS at 01:16

## 2020-01-31 RX ADMIN — Medication 30 MILLIGRAM(S): at 17:15

## 2020-01-31 RX ADMIN — Medication 975 MILLIGRAM(S): at 07:15

## 2020-01-31 RX ADMIN — Medication 30 MILLIGRAM(S): at 07:55

## 2020-01-31 RX ADMIN — Medication 975 MILLIGRAM(S): at 13:14

## 2020-01-31 RX ADMIN — Medication 1000 MILLIGRAM(S): at 10:20

## 2020-01-31 RX ADMIN — Medication 30 MILLIGRAM(S): at 06:30

## 2020-01-31 RX ADMIN — OXYCODONE HYDROCHLORIDE 10 MILLIGRAM(S): 5 TABLET ORAL at 01:53

## 2020-01-31 RX ADMIN — Medication 975 MILLIGRAM(S): at 06:15

## 2020-01-31 NOTE — DIETITIAN INITIAL EVALUATION ADULT. - ENERGY NEEDS
5'4'' 136 pounds;  pounds +/-10% %JTU=760% BMI 23.4  current body wt used for energy calculations as pt falls within % IBW; adjusted for post op state - recommend upper end of needs

## 2020-01-31 NOTE — PROGRESS NOTE ADULT - ASSESSMENT
33 yo female with PMH BRCA 1 pathologic mutation with multiple family members with breast cancer POD 3 s/p prophylactic bilateral nipple sparing mastectomy with MELVIN reconstruction and bilateral salpingo-oophorectomy. Pulse checks appropriate, doing well.    Hgb stable x2 at 6.8 to 6.2 on POD1, Patient asymptomatic, normotensive, and not tachycardic.      Pain controlled.   Monitor drain output  DVT ppx   Encourage IS/Ambulation/OOB  Patient aware to follow up with Dr. Power after discharge.

## 2020-01-31 NOTE — PROGRESS NOTE ADULT - SUBJECTIVE AND OBJECTIVE BOX
Plastic Note    Pt comfortable without complaints, pain controlled  Denies CP, SOB, N/V, numbness/tingling     Vital Signs Last 24 Hrs  T(C): 37 (01-31-20 @ 05:30), Max: 37 (01-31-20 @ 05:30)  T(F): 98.6 (01-31-20 @ 05:30), Max: 98.6 (01-31-20 @ 05:30)  HR: 82 (01-31-20 @ 05:30) (82 - 82)  BP: 109/68 (01-31-20 @ 05:30) (109/68 - 109/68)  BP(mean): --  RR: 16 (01-31-20 @ 05:30) (16 - 16)  SpO2: 97% (01-31-20 @ 05:30) (97% - 97%)    General: Pt Alert and oriented, NAD  Chest: Bilateral breast incisions intact, mild TTP in axillae   improving amy ecchymoses over bilateral upper poles.  NAC appear viable and well positioned bilaterally.   MELVIN flaps appropriate color, temperature, turgor, cap refill, and will good arterial doppler signals  Abd: Dressing C/D/I, abdominal incision intact with minimal bruising.    All drains w Serosanguinous output                        6.2    5.89  )-----------( 109      ( 29 Jan 2020 12:34 )             19.6           A/P 34F s/p b/l simple NSM, b/l BSO, breast reconstruction with bilateral MELVIN flaps POD3  - Continue q1 hour flap check  - IV tylenol/toradol  - Continue ASA  - Lovenox  - Ambulate with assistance  - CLD  - continue maintenance fluids.   - PT eval/WBAT  - dispo: Saturday 2/1

## 2020-01-31 NOTE — PROGRESS NOTE ADULT - SUBJECTIVE AND OBJECTIVE BOX
DAILY PROGRESS NOTE:    S: No acute events overnight. She was able to walk around the unit more in the evening yesterday and tolerate diet. States that surgical incisional pain is improving. Using incentive spirometer often, between [id number] rina. Denies dizziness or lightheadedness.     O:    Vital Signs Last 24 Hrs  T(C): 36.7 (30 Jan 2020 21:00), Max: 37 (30 Jan 2020 06:00)  T(F): 98 (30 Jan 2020 21:00), Max: 98.6 (30 Jan 2020 06:00)  HR: 82 (30 Jan 2020 21:00) (72 - 89)  BP: 105/63 (30 Jan 2020 21:00) (91/61 - 105/63)  BP(mean): --  RR: 17 (30 Jan 2020 21:00) (16 - 18)  SpO2: 98% (30 Jan 2020 21:00) (97% - 99%)    I&O's Detail    29 Jan 2020 07:01  -  30 Jan 2020 07:00  --------------------------------------------------------  IN:    dextrose 5% + sodium chloride 0.45%.: 2200 mL    lactated ringers.: 250 mL  Total IN: 2450 mL    OUT:    Drain: 64 mL    Drain: 84.5 mL    Drain: 46 mL    Drain: 80 mL    Indwelling Catheter - Urethral: 2000 mL    Voided: 1700 mL  Total OUT: 3974.5 mL    Total NET: -1524.5 mL      30 Jan 2020 07:01  -  31 Jan 2020 05:55  --------------------------------------------------------  IN:    dextrose 5% + sodium chloride 0.45%.: 600 mL  Total IN: 600 mL    OUT:    Drain: 67 mL    Drain: 32 mL    Drain: 32 mL    Drain: 29.5 mL    Voided: 2950 mL  Total OUT: 3110.5 mL    Total NET: -2510.5 mL      Physical Exam:    Gen: NAD  Neuro: A&Ox3  Resp: No incr WOB  CV: not tachycardic  Breast: surgical bra in pkace. left and right side breast DEVEN drains in place with serosanguinous output. soft breast, no hematomas. incisions sites c/d/i. bruising greatly improved on right breast, slight bruising remains on left breast at 7 o clock from nipple.. skin paddles soft, pink. adequate doppler signs bilaterally. nipple skin viable.  Abd: Soft, nondistended. 2 abdominal DEVEN drains in place with serosanguinous output. Abdominal incision c/d/i, no bleeding or erythema.   : Voids  Extr: WWP, no edema    LABS:                        6.2    5.89  )-----------( 109      ( 29 Jan 2020 12:34 )             19.6     01-29    141  |  108  |  10  ----------------------------<  120<H>  3.9   |  23  |  0.79    Ca    7.4<L>      29 Jan 2020 06:10            RADIOLOGY & ADDITIONAL STUDIES:

## 2020-01-31 NOTE — DIETITIAN INITIAL EVALUATION ADULT. - OTHER INFO
35yo F admitted 1/28 - no H&P, pt is BRCA1 gene mutation positive, s/p Bilateral simple mastectomy & Open bilateral salpingo-oophorectomy (1/28). No pain per flow sheets, noted however with abdominal discomfort. No edema/pressure ulcers; SX site noted.   Please see below for nutritions recommendations. 35yo F admitted 1/28 - no H&P, pt is BRCA1 gene mutation positive, s/p Bilateral simple mastectomy & Open bilateral salpingo-oophorectomy (1/28). No pain per flow sheets, noted however with abdominal discomfort. No edema/pressure ulcers; SX site noted.   Pt eating well PTA, denies issues chewing/swallowing, NKFA. On regular diet at this time, reports is eating, however %PO intake is slightly less then normal - reports d/t ABD discomfort, reports feelings of tightness; Denies N/V, Denies BM since SX.  pounds, reports had been slightly lower prior to holidays which was intended.   Education: Tips for constipation provided. Encourage PO intake during meals & reviewed importance- reviewed lean proteins.   Please see below for nutritions recommendations.

## 2020-02-01 ENCOUNTER — TRANSCRIPTION ENCOUNTER (OUTPATIENT)
Age: 35
End: 2020-02-01

## 2020-02-01 VITALS
OXYGEN SATURATION: 97 % | RESPIRATION RATE: 16 BRPM | HEART RATE: 82 BPM | TEMPERATURE: 98 F | DIASTOLIC BLOOD PRESSURE: 79 MMHG | SYSTOLIC BLOOD PRESSURE: 117 MMHG

## 2020-02-01 RX ORDER — ASPIRIN/CALCIUM CARB/MAGNESIUM 324 MG
1 TABLET ORAL
Qty: 0 | Refills: 0 | DISCHARGE

## 2020-02-01 RX ORDER — NORETHINDRONE AND ETHINYL ESTRADIOL 0.4-0.035
1 KIT ORAL
Qty: 0 | Refills: 0 | DISCHARGE

## 2020-02-01 RX ORDER — ONDANSETRON 4 MG/1
4 TABLET ORAL EVERY 6 HOURS
Qty: 20 | Refills: 0 | Status: ACTIVE | COMMUNITY
Start: 2020-02-01 | End: 1900-01-01

## 2020-02-01 RX ADMIN — ONDANSETRON 4 MILLIGRAM(S): 8 TABLET, FILM COATED ORAL at 09:25

## 2020-02-01 RX ADMIN — ENOXAPARIN SODIUM 40 MILLIGRAM(S): 100 INJECTION SUBCUTANEOUS at 12:39

## 2020-02-01 RX ADMIN — Medication 975 MILLIGRAM(S): at 07:12

## 2020-02-01 RX ADMIN — Medication 975 MILLIGRAM(S): at 06:12

## 2020-02-01 RX ADMIN — Medication 325 MILLIGRAM(S): at 11:23

## 2020-02-01 RX ADMIN — Medication 5 MILLIGRAM(S): at 11:22

## 2020-02-01 RX ADMIN — OXYCODONE HYDROCHLORIDE 10 MILLIGRAM(S): 5 TABLET ORAL at 03:37

## 2020-02-01 RX ADMIN — Medication 975 MILLIGRAM(S): at 00:20

## 2020-02-01 RX ADMIN — OXYCODONE HYDROCHLORIDE 10 MILLIGRAM(S): 5 TABLET ORAL at 04:37

## 2020-02-01 NOTE — DISCHARGE NOTE NURSING/CASE MANAGEMENT/SOCIAL WORK - PATIENT PORTAL LINK FT
You can access the FollowMyHealth Patient Portal offered by [facility] by registering at the following website: [email or web address]. By joining [employer]’s FollowMyHealth portal, you will also be able to view your health information using other applications (apps) compatible with our system.

## 2020-02-03 LAB — SURGICAL PATHOLOGY STUDY: SIGNIFICANT CHANGE UP

## 2020-02-04 PROCEDURE — 86901 BLOOD TYPING SEROLOGIC RH(D): CPT

## 2020-02-04 PROCEDURE — C1889: CPT

## 2020-02-04 PROCEDURE — 85027 COMPLETE CBC AUTOMATED: CPT

## 2020-02-04 PROCEDURE — P9045: CPT

## 2020-02-04 PROCEDURE — 86850 RBC ANTIBODY SCREEN: CPT

## 2020-02-04 PROCEDURE — 80048 BASIC METABOLIC PNL TOTAL CA: CPT

## 2020-02-04 PROCEDURE — 88307 TISSUE EXAM BY PATHOLOGIST: CPT

## 2020-02-04 PROCEDURE — 88331 PATH CONSLTJ SURG 1 BLK 1SPC: CPT

## 2020-02-04 PROCEDURE — 82962 GLUCOSE BLOOD TEST: CPT

## 2020-02-04 PROCEDURE — 97162 PT EVAL MOD COMPLEX 30 MIN: CPT

## 2020-02-04 PROCEDURE — 36415 COLL VENOUS BLD VENIPUNCTURE: CPT

## 2020-02-04 PROCEDURE — 88305 TISSUE EXAM BY PATHOLOGIST: CPT

## 2020-02-06 ENCOUNTER — APPOINTMENT (OUTPATIENT)
Dept: PLASTIC SURGERY | Facility: CLINIC | Age: 35
End: 2020-02-06
Payer: COMMERCIAL

## 2020-02-06 ENCOUNTER — APPOINTMENT (OUTPATIENT)
Dept: GYNECOLOGIC ONCOLOGY | Facility: CLINIC | Age: 35
End: 2020-02-06
Payer: COMMERCIAL

## 2020-02-06 VITALS
WEIGHT: 134 LBS | BODY MASS INDEX: 22.88 KG/M2 | SYSTOLIC BLOOD PRESSURE: 112 MMHG | HEIGHT: 64 IN | OXYGEN SATURATION: 98 % | DIASTOLIC BLOOD PRESSURE: 71 MMHG | HEART RATE: 96 BPM

## 2020-02-06 PROCEDURE — 99024 POSTOP FOLLOW-UP VISIT: CPT

## 2020-02-06 NOTE — DISCUSSION/SUMMARY
[FreeTextEntry1] : Benign pathology reviewed in detail\par Excellent post operative recovery\par Discussed HRT, patient without menopausal symptoms currently, will discuss with her primary gyn in [place]\par Discussed continue need for paps \par Would recommend baseline DEXA at age 48\par Further care as needed

## 2020-02-06 NOTE — PAST MEDICAL HISTORY
[Menstruating] : The patient is menstruating [Menarche Age ____] : age at menarche was [unfilled] [Total Preg ___] : G[unfilled] [Approximately ___] : the LMP was approximately [unfilled] [Full Term ___] : Full Term: [unfilled] [FreeTextEntry5] : Vaginal delivery x3\par

## 2020-02-06 NOTE — PHYSICAL EXAM
[Normal] : Mood and affect: Normal [de-identified] : william duffy [Fully active, able to carry on all pre-disease performance without restriction] : Status 0 - Fully active, able to carry on all pre-disease performance without restriction

## 2020-02-06 NOTE — HISTORY OF PRESENT ILLNESS
[FreeTextEntry1] : Problem\par 1) BRCA1 mutation\par 2) No personal history of cancer, Maternal Breast Ca age 46, Maternal aunt breast Ca age 60, Maternal grandmother breast ca age 60\par \par Previous Therapy\par 1) Bilateral prophylatic mastectomy, breast reconstruction, prophylactic BSO 1/28/20 combined procedure with Dr. Power and Dr. Lang\par    a) unremarkable pathology - benign tubes/ovaries\par \par Here for 1 week follow up. Feeling well. Off all narcotics.

## 2020-02-07 NOTE — HISTORY OF PRESENT ILLNESS
[FreeTextEntry1] : 9 days PO s/p b/l mastectomy with MELVIN flap, RRSO for BRCA1 mutation. Pathology benign. Doing well. No fever or chills. Pain controlled. Drains with low output. \par Incisions C/D/I, no collection or infection \par PO instructions reviewed\par RTC in 1 week\par

## 2020-02-11 RX ORDER — SULFAMETHOXAZOLE AND TRIMETHOPRIM 800; 160 MG/1; MG/1
800-160 TABLET ORAL TWICE DAILY
Qty: 20 | Refills: 0 | Status: ACTIVE | COMMUNITY
Start: 2020-02-11 | End: 1900-01-01

## 2020-02-13 ENCOUNTER — APPOINTMENT (OUTPATIENT)
Dept: PLASTIC SURGERY | Facility: CLINIC | Age: 35
End: 2020-02-13
Payer: COMMERCIAL

## 2020-02-13 ENCOUNTER — APPOINTMENT (OUTPATIENT)
Dept: SURGERY | Facility: CLINIC | Age: 35
End: 2020-02-13
Payer: COMMERCIAL

## 2020-02-13 DIAGNOSIS — Z15.01 GENETIC SUSCEPTIBILITY TO MALIGNANT NEOPLASM OF BREAST: ICD-10-CM

## 2020-02-13 DIAGNOSIS — Z80.3 FAMILY HISTORY OF MALIGNANT NEOPLASM OF BREAST: ICD-10-CM

## 2020-02-13 DIAGNOSIS — Z40.01 ENCOUNTER FOR PROPHYLACTIC REMOVAL OF BREAST: ICD-10-CM

## 2020-02-13 DIAGNOSIS — Z31.5 ENCOUNTER FOR PROCREATIVE GENETIC COUNSELING: ICD-10-CM

## 2020-02-13 PROCEDURE — 99024 POSTOP FOLLOW-UP VISIT: CPT

## 2020-02-13 NOTE — HISTORY OF PRESENT ILLNESS
[FreeTextEntry1] : 16 days PO s/p b/l mastectomy with MELVIN flap, RRSO for BRCA1 mutation. Doing well. \par Incisions C/D/I, no collection or infection \par s/p debridement of necrosis on left NAC complex-- 2 x 1 x 1 cm wound packed with moistened gauze\par Initiated WTD\par RTC in 6 weeks\par

## 2020-02-18 NOTE — HISTORY OF PRESENT ILLNESS
[FreeTextEntry1] : 34 year old female presents for post op she is s/p bilateral prophylactic mastectomy with MELVIN flap reconstruction for BRCA 1 mutation on 1/28/2020 with Dr. Lang with simultaneous BSO with Dr. San. Her final surgical pathology was benign.

## 2020-02-18 NOTE — DATA REVIEWED
[FreeTextEntry1] : 1/28/2020 ([facility]) b/l mastectomy pathology right RA tissue, benign, left RA tissue benign, left mastectomy pathology benign breast tissue, right breast pathology benign breast tissue, right internal mammary lymph node pathology reactive lymph node, right fallopian tube and ovary benign, left fallopian tube and ovary benign.

## 2020-02-23 NOTE — HISTORY OF PRESENT ILLNESS
[FreeTextEntry1] : Problem\par 1) BRCA1 mutation\par 2) No personal history of cancer, Maternal Breast Ca age 46, Maternal aunt breast Ca age 60, Maternal grandmother breast ca age 60\par \par Previous Therapy\par 1) Bilateral prophylactic mastectomy, breast reconstruction, prophylactic BSO 1/28/20 combined procedure with Dr. Power and Dr. Lang\par    a) unremarkable pathology - benign tubes/ovaries\par \par Here for 1 month follow up. Feeling well.

## 2020-02-23 NOTE — PHYSICAL EXAM
[Absent] : Adnexa(ae): Absent [Normal] : Bimanual Exam: Normal [de-identified] : william duffy [Fully active, able to carry on all pre-disease performance without restriction] : Status 0 - Fully active, able to carry on all pre-disease performance without restriction

## 2020-02-24 ENCOUNTER — APPOINTMENT (OUTPATIENT)
Dept: GYNECOLOGIC ONCOLOGY | Facility: CLINIC | Age: 35
End: 2020-02-24

## 2021-03-02 NOTE — PROGRESS NOTE ADULT - PROVIDER SPECIALTY LIST ADULT
Surgery Xolair Pregnancy And Lactation Text: This medication is Pregnancy Category B and is considered safe during pregnancy. This medication is excreted in breast milk.

## 2021-08-06 ENCOUNTER — APPOINTMENT (OUTPATIENT)
Dept: URBAN - METROPOLITAN AREA CLINIC 259 | Age: 36
Setting detail: DERMATOLOGY
End: 2021-08-10

## 2021-08-06 DIAGNOSIS — Z41.9 ENCOUNTER FOR PROCEDURE FOR PURPOSES OTHER THAN REMEDYING HEALTH STATE, UNSPECIFIED: ICD-10-CM

## 2021-08-06 PROCEDURE — OTHER BOTOX: OTHER

## 2021-08-20 ENCOUNTER — APPOINTMENT (OUTPATIENT)
Dept: URBAN - METROPOLITAN AREA CLINIC 259 | Age: 36
Setting detail: DERMATOLOGY
End: 2021-08-23

## 2021-08-20 DIAGNOSIS — Z41.9 ENCOUNTER FOR PROCEDURE FOR PURPOSES OTHER THAN REMEDYING HEALTH STATE, UNSPECIFIED: ICD-10-CM

## 2021-08-20 PROCEDURE — OTHER COSMETIC CONSULTATION: FILLERS: OTHER

## 2021-08-20 PROCEDURE — OTHER BOTOX: OTHER

## 2021-09-08 ENCOUNTER — NON-APPOINTMENT (OUTPATIENT)
Age: 36
End: 2021-09-08

## 2021-09-09 ENCOUNTER — APPOINTMENT (OUTPATIENT)
Dept: PLASTIC SURGERY | Facility: CLINIC | Age: 36
End: 2021-09-09
Payer: COMMERCIAL

## 2021-09-09 DIAGNOSIS — Z15.01 GENETIC SUSCEPTIBILITY TO MALIGNANT NEOPLASM OF BREAST: ICD-10-CM

## 2021-09-09 DIAGNOSIS — Z15.09 GENETIC SUSCEPTIBILITY TO MALIGNANT NEOPLASM OF BREAST: ICD-10-CM

## 2021-09-09 DIAGNOSIS — Z42.1 ENCOUNTER FOR BREAST RECONSTRUCTION FOLLOWING MASTECTOMY: ICD-10-CM

## 2021-09-09 PROCEDURE — 99202 OFFICE O/P NEW SF 15 MIN: CPT | Mod: 95

## 2021-09-09 RX ORDER — OXYCODONE AND ACETAMINOPHEN 5; 325 MG/1; MG/1
5-325 TABLET ORAL
Qty: 20 | Refills: 0 | Status: ACTIVE | COMMUNITY
Start: 2021-09-09 | End: 1900-01-01

## 2021-09-09 NOTE — HISTORY OF PRESENT ILLNESS
[Home] : at home, [unfilled] , at the time of the visit. [Other Location: e.g. Home (Enter Location, City,State)___] : at [unfilled] [Verbal consent obtained from patient] : the patient, [unfilled] [FreeTextEntry1] : Patient Name: KACIE WALL \par : Sep 10 1985 \par Date: 2021 \par Attending: Dr. Carlton Lang\par \par HPI: pre-op consultation for breast reconstruction revision, left nipple reconstruction, fat grafting and abdominal donor site revision on 21.\par \par Allergies: NKDA\par Medication prescribed: percocet 5-325 mg  \par \par ROS: complete 14 point review of systems negative except pertinent items reviewed in the HPI. Other non-contributory items reviewed in our new patient questionnaire and we have submitted it to be scanned into the medical record. \par \par Physical Exam: \par completed at time of in office evaluation/telehealth visit \par \par Assessment/Plan:\par We have discussed pre and postop instructions, recovery limitations, restrictions and expectations, ERAS protocol, NPO status, transportation home, postop medications, COVID-19 policies, benefits and risks of the procedure. The patient would like to proceed with surgery as scheduled.\par \par SIRIA REYNOLDS NP\par \par

## 2021-09-10 ENCOUNTER — APPOINTMENT (OUTPATIENT)
Dept: URBAN - METROPOLITAN AREA CLINIC 259 | Age: 36
Setting detail: DERMATOLOGY
End: 2021-09-10

## 2021-09-10 DIAGNOSIS — Z41.9 ENCOUNTER FOR PROCEDURE FOR PURPOSES OTHER THAN REMEDYING HEALTH STATE, UNSPECIFIED: ICD-10-CM

## 2021-09-10 PROCEDURE — OTHER TCA CHEMICAL PEEL: OTHER

## 2021-09-10 ASSESSMENT — LOCATION DETAILED DESCRIPTION DERM: LOCATION DETAILED: INFERIOR MID FOREHEAD

## 2021-09-10 ASSESSMENT — LOCATION ZONE DERM: LOCATION ZONE: FACE

## 2021-09-10 ASSESSMENT — LOCATION SIMPLE DESCRIPTION DERM: LOCATION SIMPLE: INFERIOR FOREHEAD

## 2021-09-10 NOTE — PROCEDURE: TCA CHEMICAL PEEL
Treatment Number: 0
Chemical Peel: 25% TCA
Time (Mins): 1
Post-Care Instructions: I reviewed with the patient in detail post-care instructions. Patient should avoid sun exposure and wear sun protection.
Price (Use Numbers Only, No Special Characters Or $): 90
Consent: Prior to the procedure, written consent was obtained and risks were reviewed, including but not limited to: redness, peeling, blistering, pigmentary change, scarring, infection, and pain. We did the glytone TCA on her today with a acetone prep.
Post Peel Care: After the procedure, the treatment area was neutralized and a post-peel cream was applied. Sun protection and post-care instructions were reviewed with the patient.
Frost (0,1+,2+,3+,4+): 2+
Prep: The treated area was degreased with pre-peel cleanser, and vaseline was applied for protection of mucous membranes.
Detail Level: Simple
Erythema: mild

## 2021-09-16 ENCOUNTER — APPOINTMENT (OUTPATIENT)
Dept: PLASTIC SURGERY | Facility: CLINIC | Age: 36
End: 2021-09-16
Payer: COMMERCIAL

## 2021-09-16 ENCOUNTER — TRANSCRIPTION ENCOUNTER (OUTPATIENT)
Age: 36
End: 2021-09-16

## 2021-09-16 PROCEDURE — 99213 OFFICE O/P EST LOW 20 MIN: CPT

## 2021-09-17 ENCOUNTER — NON-APPOINTMENT (OUTPATIENT)
Age: 36
End: 2021-09-17

## 2021-09-17 ENCOUNTER — OUTPATIENT (OUTPATIENT)
Dept: OUTPATIENT SERVICES | Facility: HOSPITAL | Age: 36
LOS: 1 days | Discharge: HOME | End: 2021-09-17
Payer: COMMERCIAL

## 2021-09-17 DIAGNOSIS — Z98.890 OTHER SPECIFIED POSTPROCEDURAL STATES: Chronic | ICD-10-CM

## 2021-09-17 PROCEDURE — 19350 NIPPLE/AREOLA RECONSTRUCTION: CPT

## 2021-09-17 PROCEDURE — 14301 TIS TRNFR ANY 30.1-60 SQ CM: CPT | Mod: 59

## 2021-09-17 PROCEDURE — 15771 GRFG AUTOL FAT LIPO 50 CC/<: CPT

## 2021-09-17 PROCEDURE — 19380 REVJ RECONSTRUCTED BREAST: CPT

## 2021-09-17 PROCEDURE — 15772 GRFG AUTOL FAT LIPO EA ADDL: CPT

## 2021-09-20 ENCOUNTER — APPOINTMENT (OUTPATIENT)
Dept: PLASTIC SURGERY | Facility: CLINIC | Age: 36
End: 2021-09-20

## 2021-09-22 NOTE — HISTORY OF PRESENT ILLNESS
[FreeTextEntry1] : 36 y.o. female present 18 months PO s/p b/l mastectomy with MELVIN flap, and BSO for BRCA1 mutation. Here to review second stage surgery.\par \par Exam: well healed from 1st stage surgery. Right DIP larger than Left. Abdominal scar deformity.\par \par Today we reviewed nature of revision surgery including: b/l breast fat grafting, excision of MELVIN flap skin paddles, left nipple reconstruction, abdominal scar revision.\par All questions answered.\par Surgery already scheduled for tomorrow.

## 2021-09-24 ENCOUNTER — APPOINTMENT (OUTPATIENT)
Dept: URBAN - METROPOLITAN AREA CLINIC 259 | Age: 36
Setting detail: DERMATOLOGY
End: 2021-09-27

## 2021-09-24 DIAGNOSIS — Z41.9 ENCOUNTER FOR PROCEDURE FOR PURPOSES OTHER THAN REMEDYING HEALTH STATE, UNSPECIFIED: ICD-10-CM

## 2021-09-24 PROCEDURE — OTHER JUVEDERM ULTRA XC INJECTION: OTHER

## 2021-09-24 PROCEDURE — OTHER JUVEDERM VOLUMA XC INJECTION: OTHER

## 2021-09-24 PROCEDURE — OTHER BOTOX: OTHER

## 2021-09-24 NOTE — PROCEDURE: JUVEDERM VOLUMA XC INJECTION
Include Cannula Information In Note?: No
Jawline Filler Volume In Cc: 0
Detail Level: Zone
Filler: Juvederm Voluma XC
Number Of Syringes (Required For Inventory): 1
Anesthesia Volume In Cc: 0.5
Map Statment: See Attach Map for Complete Details
Post-Care Instructions: Patient instructed to apply ice to reduce swelling.
Additional Anesthesia Volume In Cc: 6
Procedural Text: The filler was administered to the treatment areas noted above.
Topical Anesthesia?: BLT cream (benzocaine 20%, lidocaine 10%, tetracaine 4%)
Consent: Written consent obtained. Risks include but not limited to bruising, beading, irregular texture, ulceration, infection, allergic reaction, scar formation, incomplete augmentation, temporary nature, procedural pain.

## 2021-09-24 NOTE — PROCEDURE: BOTOX
Show Additional Area 5: Yes
Topical Anesthesia?: BLT cream (benzocaine 20%, lidocaine 10%, tetracaine 4%)
Right Periorbital Units: 0
Show Mentalis Units: No
Dilution (U/0.1 Cc): 1
Consent: Written consent obtained. Risks include but not limited to lid/brow ptosis, bruising, swelling, diplopia, temporary effect, incomplete chemical denervation.
Additional Area 1 Units: 6
Detail Level: Detailed
Additional Area 1 Location: lip flip
Post-Care Instructions: Patient instructed to not lie down for 4 hours and limit physical activity for 24 hours.

## 2021-09-24 NOTE — PROCEDURE: JUVEDERM ULTRA XC INJECTION
Post-Care Instructions: Patient instructed to apply ice to reduce swelling.
Brows Filler  Volume In Cc: 0
Nasolabial Folds Filler Volume In Cc: 1
Detail Level: Simple
Include Cannula Length?: 1.5 inch
Anesthesia Volume In Cc: 0.5
Include Cannula Information In Note?: Yes
Procedural Text: The filler was administered to the treatment areas noted above.
Map Statment: See Attach Map for Complete Details
Topical Anesthesia?: BLT cream (benzocaine 20%, lidocaine 10%, tetracaine 4%)
Filler: Juvederm Ultra XC
Additional Anesthesia Volume In Cc: 6
Consent: Written consent obtained. Risks include but not limited to bruising, beading, irregular texture, ulceration, infection, allergic reaction, scar formation, incomplete augmentation, temporary nature, procedural pain.
Use Map Statement For Sites (Optional): No
Include Cannula Size?: 25G

## 2022-02-17 ENCOUNTER — APPOINTMENT (OUTPATIENT)
Dept: URBAN - METROPOLITAN AREA CLINIC 259 | Age: 37
Setting detail: DERMATOLOGY
End: 2022-02-17

## 2022-02-17 DIAGNOSIS — Z41.9 ENCOUNTER FOR PROCEDURE FOR PURPOSES OTHER THAN REMEDYING HEALTH STATE, UNSPECIFIED: ICD-10-CM

## 2022-02-17 PROCEDURE — OTHER BOTOX: OTHER

## 2022-02-17 PROCEDURE — OTHER MIPS QUALITY: OTHER

## 2022-02-17 ASSESSMENT — LOCATION ZONE DERM
LOCATION ZONE: FACE
LOCATION ZONE: LIP

## 2022-02-17 ASSESSMENT — LOCATION SIMPLE DESCRIPTION DERM
LOCATION SIMPLE: RIGHT LIP
LOCATION SIMPLE: SUPERIOR FOREHEAD
LOCATION SIMPLE: LEFT LIP
LOCATION SIMPLE: RIGHT FOREHEAD
LOCATION SIMPLE: LEFT FOREHEAD
LOCATION SIMPLE: INFERIOR FOREHEAD
LOCATION SIMPLE: CHIN

## 2022-02-17 ASSESSMENT — LOCATION DETAILED DESCRIPTION DERM
LOCATION DETAILED: INFERIOR MID FOREHEAD
LOCATION DETAILED: RIGHT UPPER CUTANEOUS LIP
LOCATION DETAILED: LEFT INFERIOR LATERAL FOREHEAD
LOCATION DETAILED: LEFT CHIN
LOCATION DETAILED: RIGHT INFERIOR LATERAL FOREHEAD
LOCATION DETAILED: LEFT UPPER CUTANEOUS LIP
LOCATION DETAILED: SUPERIOR MID FOREHEAD

## 2022-04-25 ENCOUNTER — APPOINTMENT (OUTPATIENT)
Dept: URBAN - METROPOLITAN AREA CLINIC 259 | Age: 37
Setting detail: DERMATOLOGY
End: 2022-04-25

## 2022-04-25 DIAGNOSIS — Z41.9 ENCOUNTER FOR PROCEDURE FOR PURPOSES OTHER THAN REMEDYING HEALTH STATE, UNSPECIFIED: ICD-10-CM

## 2022-04-25 PROCEDURE — OTHER HYDRAFACIAL: OTHER

## 2022-04-25 ASSESSMENT — LOCATION SIMPLE DESCRIPTION DERM: LOCATION SIMPLE: LEFT FOREHEAD

## 2022-04-25 ASSESSMENT — LOCATION DETAILED DESCRIPTION DERM: LOCATION DETAILED: LEFT MEDIAL FOREHEAD

## 2022-04-25 ASSESSMENT — LOCATION ZONE DERM: LOCATION ZONE: FACE

## 2022-04-25 NOTE — PROCEDURE: HYDRAFACIAL
Vacuum Pressure Low Setting (Will Not Render If Set To 0): 13
Treatment Number: 1
Number Of Passes: 2
Vacuum Pressure High Setting (Will Not Render If Set To 0): 0
Vacuum Pressure Low Setting (Will Not Render If Set To 0): 14
Solution: Antiox-6
Solution Override
Solution: GlySal 7.5%
Solution: Beta-HD
Consent: Written consent obtained, risks reviewed including but not limited to crusting, scabbing, blistering, scarring, darker or lighter pigmentary change, bruising, and/or incomplete response.
Indication: dehydrated skin
Location: face
Procedure: Extraction
Procedure: Exfoliation
Tip Override
Tip: Hydropeel Tip, Teal
Price (Use Numbers Only, No Special Characters Or $): 160
Tip: Hydropeel Tip, Clear
Procedure: Extend and Protect
Post-Care Instructions: I reviewed with the patient in detail post-care instructions. Patient should stay away from the sun and wear sun protection until treated areas are fully healed.
Vacuum Pressure High Setting (Will Not Render If Set To 0): 28
Solution: Activ-4
Tip: Hydropeel Tip, Blue
Procedure: Peel

## 2022-09-09 ENCOUNTER — APPOINTMENT (OUTPATIENT)
Dept: URBAN - METROPOLITAN AREA CLINIC 259 | Age: 37
Setting detail: DERMATOLOGY
End: 2022-09-13

## 2022-09-09 DIAGNOSIS — Z41.9 ENCOUNTER FOR PROCEDURE FOR PURPOSES OTHER THAN REMEDYING HEALTH STATE, UNSPECIFIED: ICD-10-CM

## 2022-09-09 PROCEDURE — OTHER BOTOX: OTHER

## 2022-09-09 NOTE — PROCEDURE: BOTOX
Additional Area 3 Location: Chin
Additional Area 3 Units: 6
Additional Area 1 Location: Lateral brow
Show Nasal Units: Yes
Additional Area 5 Units: 0
Show Lcl Units: No
Dilution (U/0.1 Cc): 10
Glabellar Complex Units: 16
Additional Area 2 Location: upper lip
Consent: Written consent obtained. Risks include but not limited to lid/brow ptosis, bruising, swelling, diplopia, temporary effect, incomplete chemical denervation.
Forehead Units: 11
Post-Care Instructions: Patient instructed to not lie down for 4 hours and limit physical activity for 24 hours.
Detail Level: Detailed
Additional Area 1 Units: 3

## 2022-12-15 NOTE — DISCHARGE NOTE NURSING/CASE MANAGEMENT/SOCIAL WORK - NSDPDISTO_GEN_ALL_CORE
Jaki    Appointments in Next Year    Jan 03, 2023  4:00 PM  (Arrive by 3:40 PM)  Provider Visit with Mila Will MD  [facility] ([facility] ) [phone]           Home

## 2022-12-29 NOTE — PRE-OP CHECKLIST - TO WHOM
I believe there is only one standard mammogram order. If she only wants a 2d instead of a 3d, she should let the mammogram tech know.     Meghan Painter MD     or nurse Renate NORWOOD

## 2024-10-07 ENCOUNTER — APPOINTMENT (OUTPATIENT)
Dept: URBAN - METROPOLITAN AREA CLINIC 259 | Age: 39
Setting detail: DERMATOLOGY
End: 2024-10-07

## 2024-10-07 DIAGNOSIS — Z41.9 ENCOUNTER FOR PROCEDURE FOR PURPOSES OTHER THAN REMEDYING HEALTH STATE, UNSPECIFIED: ICD-10-CM

## 2024-10-07 PROCEDURE — OTHER GLYTONE PEEL: OTHER

## 2024-10-07 NOTE — PROCEDURE: GLYTONE PEEL
Prep: The treated area was degreased with pre-peel cleanser, and vaseline was applied for protection of mucous membranes.
Consent: Prior to the procedure, written consent was obtained and risks were reviewed, including but not limited to: redness, peeling, blistering, pigmentary change, scarring, infection, and pain.
Treatment Time (Optional): 1 min
Chemical Peel: Glytone Hand Peel
Post-Care Instructions: I reviewed with the patient in detail post-care instructions. Patient should avoid sun exposure and wear sun protection.
Price (Use Numbers Only, No Special Characters Or $): 150
Post Peel Care: After the Glytone Peel was applied, detailed post-care instructions were reviewed. The patient was instructed to leave the peel on until the next day and to apply the Retin A pads and moisturizers (both provide) as directed.
Treatment Number: 1
Detail Level: Zone